# Patient Record
Sex: MALE | Race: BLACK OR AFRICAN AMERICAN | Employment: FULL TIME | URBAN - METROPOLITAN AREA
[De-identification: names, ages, dates, MRNs, and addresses within clinical notes are randomized per-mention and may not be internally consistent; named-entity substitution may affect disease eponyms.]

---

## 2020-10-19 ENCOUNTER — HOSPITAL ENCOUNTER (EMERGENCY)
Facility: CLINIC | Age: 45
Discharge: HOME | End: 2020-10-19
Attending: EMERGENCY MEDICINE | Admitting: EMERGENCY MEDICINE
Payer: COMMERCIAL

## 2020-10-19 ENCOUNTER — APPOINTMENT (OUTPATIENT)
Dept: GENERAL RADIOLOGY | Facility: CLINIC | Age: 45
End: 2020-10-19
Attending: PHYSICIAN ASSISTANT
Payer: COMMERCIAL

## 2020-10-19 VITALS
OXYGEN SATURATION: 100 % | DIASTOLIC BLOOD PRESSURE: 82 MMHG | TEMPERATURE: 97.6 F | HEART RATE: 71 BPM | RESPIRATION RATE: 16 BRPM | SYSTOLIC BLOOD PRESSURE: 121 MMHG

## 2020-10-19 DIAGNOSIS — M25.562 ACUTE PAIN OF LEFT KNEE: ICD-10-CM

## 2020-10-19 PROCEDURE — 250N000013 HC RX MED GY IP 250 OP 250 PS 637: Performed by: PHYSICIAN ASSISTANT

## 2020-10-19 PROCEDURE — 99284 EMERGENCY DEPT VISIT MOD MDM: CPT

## 2020-10-19 PROCEDURE — 73552 X-RAY EXAM OF FEMUR 2/>: CPT | Mod: LT

## 2020-10-19 PROCEDURE — 73562 X-RAY EXAM OF KNEE 3: CPT | Mod: LT

## 2020-10-19 RX ORDER — IBUPROFEN 600 MG/1
600 TABLET, FILM COATED ORAL EVERY 6 HOURS PRN
Qty: 30 TABLET | Refills: 0 | Status: SHIPPED | OUTPATIENT
Start: 2020-10-19

## 2020-10-19 RX ORDER — IBUPROFEN 600 MG/1
600 TABLET, FILM COATED ORAL ONCE
Status: COMPLETED | OUTPATIENT
Start: 2020-10-19 | End: 2020-10-19

## 2020-10-19 RX ADMIN — IBUPROFEN 600 MG: 600 TABLET, FILM COATED ORAL at 09:17

## 2020-10-19 ASSESSMENT — ENCOUNTER SYMPTOMS
FEVER: 0
ARTHRALGIAS: 1
NUMBNESS: 0
MYALGIAS: 1
CHILLS: 0

## 2020-10-19 NOTE — LETTER
October 19, 2020      To Whom It May Concern:      Cristian Palafox was seen in our Emergency Department today, 10/19/20. Please excuse him for the next 3 days pending his follow up visit.    Sincerely,        Alayna RIOS RN

## 2020-10-19 NOTE — ED AVS SNAPSHOT
[facility] Emergency Dept  [address]  [facility] [address]  Phone: [phone]  Fax: [phone]                                    Cristian Palafox   MRN: [id number]    Department: [facility] Emergency Dept   Date of Visit: 10/19/2020           After Visit Summary Signature Page    I have received my discharge instructions, and my questions have been answered. I have discussed any challenges I see with this plan with the nurse or doctor.    ..........................................................................................................................................  Patient/Patient Representative Signature      ..........................................................................................................................................  Patient Representative Print Name and Relationship to Patient    ..................................................               ................................................  Date                                   Time    ..........................................................................................................................................  Reviewed by Signature/Title    ...................................................              ..............................................  Date                                               Time          22EPIC Rev 08/18

## 2020-10-19 NOTE — DISCHARGE INSTRUCTIONS
Use knee immobilizer to minimize pain. Wear this as able, no need to wear to bed.  Use crutches to keep weight off of left leg.

## 2020-10-19 NOTE — ED PROVIDER NOTES
"  History     Chief Complaint:  Leg Pain    HPI   Cristian Palafox is a 45 year old male who presents for evaluation of left knee and thigh pain that began yesterday. The patient states for the past 2 years he has had intermittent left knee pain, noting there is a \"knot\" above his patella. He notes he also fractured his left knee cap in the past as well after running for the train. Yesterday while reaching for something out the window, he twisted his left leg and immediately had pain above his patella. Since initial onset, his pain has radiated down to below his patella. Due to his pain, he has had difficulty ambulating. To alleviate his pain, he has been using Icy Hot as well as naproxen for relief, with his last dose yesterday. Out of concern for his pain, he presented for evaluation. He denies any numbness/tingling, hip pain, fever, chills, posterior knee pain, or other symptoms prompting his presentation.     Allergies:  No Known Drug Allergies     Medications:    The patient is not currently taking any prescribed medications.     Past Medical History:    The patient denies any relevant past medical history.     Past Surgical History:    History reviewed. No pertinent past surgical history.     Family History:    The patient denies any relevant family medical history.     Social History:  The patient was unaccompanied to the ED.  Smoking Status: Current Every Day Smoker  Smokeless tobacco: Never Used  Alcohol Use: No  Drug Use: Yes    Review of Systems   Constitutional: Negative for chills and fever.   Musculoskeletal: Positive for arthralgias and myalgias.   Neurological: Negative for numbness.   All other systems reviewed and are negative.      Physical Exam     Patient Vitals for the past 24 hrs:   BP Temp Temp src Pulse Resp SpO2   10/19/20 0910 -- -- -- -- -- 100 %   10/19/20 0905 -- -- -- -- -- 98 %   10/19/20 0900 121/82 -- -- 71 -- --   10/19/20 0850 129/89 97.6  F (36.4  C) Oral 70 16 100 %     Physical " Exam  Constitutional: Pleasant. Cooperative.   Eyes: Pupils equally round and reactive  HENT: Head is normal in appearance. Oropharynx is normal with moist mucus membranes.  Cardiovascular: Regular rate and rhythm and without murmurs.  Respiratory: Normal respiratory effort, lungs are clear bilaterally.  Musculoskeletal: LLE: Palpable swelling noted to distal femur on anterior aspect. Decreased ROM to knee. TTP of distal femur, knee diffusely. Effusion noted to knee. No TTP of posterior aspect of knee/leg. Normal ROM, no TTP of hip, ankle, toes. 2+ DP pulses.   Skin: Normal, without rash. No erythema.  Neurologic: Cranial nerves grossly intact, normal cognition, no focal deficits. Alert and oriented x 3. Sensation intact distally.  Psychiatric: Normal affect.  Nursing notes and vital signs reviewed.    Emergency Department Course   Imaging:  Radiology findings were communicated with the patient who voiced understanding of the findings.    XR Femur Left 2 Views  IMPRESSION: Protruding anteriorly from the distal shaft of the femur,  there is an osteochondroma. This is about 30 mm transverse by 35 mm  cephalocaudal and is about 20 mm in depth. No fracture. MRI could  evaluate for overlying adventitial bursitis and malignant degeneration  if clinically warranted.  Reading per radiology.     XR Knee Left 3 Views   IMPRESSION: There is a moderate-sized osteochondroma protruding  anteriorly from the distal shaft of the femur. MRI could evaluate for  complications such as adventitial bursitis and malignant degeneration  if clinically warranted. Small enthesophyte at the quadriceps tendon  insertion site incidentally noted. Otherwise negative.  Reading per radiology.     Interventions:  0917 Advil 600 mg PO    Emergency Department Course:  Past medical records, nursing notes, and vitals reviewed.  The patient was sent for a XR Femur Left 2 Views, XR Knee Left 3 Views while in the emergency department, results above.       (4020)   I performed an exam of the patient as documented above. History obtained from patient.     (1000)   I rechecked the patient and discussed results and plan of care.     (1013)   Orthopedics paged.     (2896)   I spoke with PAT Woodruff of the Orthopedics service from [facility] regarding patient's presentation, findings, and plan of care.      Findings and plan explained to the Patient. Patient discharged home with instructions regarding supportive care, medications, and reasons to return. The importance of close follow-up was reviewed. The patient was prescribed Advil. I personally reviewed the imaging results with the Patient and answered all related questions prior to discharge.     Impression & Plan     Medical Decision Making:  Cristian Palafox is a 45 year old male who presented with atraumatic left knee pain.  Patient notes presence of a abnormality to his distal femur that is been present for over 1 year, noting that he was previously told it was likely muscular in etiology.  Today he was pivoting on has a leg when he felt sudden onset of severe pain.  No fevers or chills.  See HPI as above for additional details.  Vitals and physical exam as above.  Differential was broad and included fracture, sprain, strain, DVT, Baker's cyst, dislocation, cellulitis, septic arthritis, gout, among others.  X-rays obtained as above reveal evidence for moderate sized osteochondroma on distal shaft of femur.  Again no fevers or joint warmth to suspect septic arthritis.  This pain seems associated with trauma, and it is certainly possible that the osteochondroma is playing some role given its moderate size.  I discussed the case with orthopedics to facilitate urgent follow-up.  No indication for joint aspiration or admission at this time.  Will place patient in knee immobilizer, have him become nonweightbearing.  Ibuprofen and Tylenol for pain.  Felt patient was safe for discharge home. Discussed reasons to return. All  questions answered. Patient discharged to home in stable condition.    Diagnosis:    ICD-10-CM    1. Acute pain of left knee  M25.562      Disposition:  Discharged to home.    Discharge Medications:  New Prescriptions    IBUPROFEN (ADVIL/MOTRIN) 600 MG TABLET    Take 1 tablet (600 mg) by mouth every 6 hours as needed for moderate pain        Scribe Disclosure:  I, Anuj Briggs, am serving as a scribe at 9:02 AM on 10/19/2020 to document services personally performed by Brad Long PA-C based on my observations and the provider's statements to me.    10/19/2020   [facility] EMERGENCY DEPT    This record was created at least in part using electronic voice recognition software, so please excuse any typographical errors.         Brad Long PA-C  10/19/20 [id number]

## 2020-10-19 NOTE — ED TRIAGE NOTES
"Arrives with left knee and thigh pain since yesterday, states it started when he was reaching for something and twisted his leg. Also reports he has a \"lump\" above his left knee that has been there for two years.  Alert and oriented, ABCs intact.   "